# Patient Record
Sex: MALE | Race: WHITE | NOT HISPANIC OR LATINO | Employment: FULL TIME | URBAN - METROPOLITAN AREA
[De-identification: names, ages, dates, MRNs, and addresses within clinical notes are randomized per-mention and may not be internally consistent; named-entity substitution may affect disease eponyms.]

---

## 2023-02-28 ENCOUNTER — HOSPITAL ENCOUNTER (EMERGENCY)
Facility: HOSPITAL | Age: 58
Discharge: HOME | End: 2023-02-28
Attending: EMERGENCY MEDICINE | Admitting: EMERGENCY MEDICINE
Payer: COMMERCIAL

## 2023-02-28 ENCOUNTER — APPOINTMENT (OUTPATIENT)
Dept: GENERAL RADIOLOGY | Facility: HOSPITAL | Age: 58
End: 2023-02-28
Payer: COMMERCIAL

## 2023-02-28 VITALS
SYSTOLIC BLOOD PRESSURE: 140 MMHG | BODY MASS INDEX: 35.22 KG/M2 | WEIGHT: 289.24 LBS | DIASTOLIC BLOOD PRESSURE: 82 MMHG | HEIGHT: 76 IN | TEMPERATURE: 98 F | OXYGEN SATURATION: 93 % | RESPIRATION RATE: 30 BRPM | HEART RATE: 101 BPM

## 2023-02-28 DIAGNOSIS — S43.402A SPRAIN OF LEFT SHOULDER, UNSPECIFIED SHOULDER SPRAIN TYPE, INITIAL ENCOUNTER: Primary | ICD-10-CM
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
## 2023-02-28 DIAGNOSIS — S50.11XA TRAUMATIC HEMATOMA OF RIGHT FOREARM, INITIAL ENCOUNTER: ICD-10-CM

## 2023-02-28 LAB
ALBUMIN SERPL-MCNC: 3.3 G/DL (ref 3.5–5.2)
ALBUMIN/GLOB SERPL: 1.4 G/DL
ALP SERPL-CCNC: 45 U/L (ref 39–117)
ALT SERPL W P-5'-P-CCNC: 26 U/L (ref 1–41)
ANION GAP SERPL CALCULATED.3IONS-SCNC: 8.8 MMOL/L (ref 5–15)
AST SERPL-CCNC: 21 U/L (ref 1–40)
BASOPHILS # BLD AUTO: 0.11 10*3/MM3 (ref 0–0.2)
BASOPHILS NFR BLD AUTO: 0.9 % (ref 0–1.5)
BILIRUB SERPL-MCNC: 0.3 MG/DL (ref 0–1.2)
BUN SERPL-MCNC: 13 MG/DL (ref 6–20)
BUN/CREAT SERPL: 16.3 (ref 7–25)
CALCIUM SPEC-SCNC: 6.9 MG/DL (ref 8.6–10.5)
CHLORIDE SERPL-SCNC: 109 MMOL/L (ref 98–107)
CO2 SERPL-SCNC: 20.2 MMOL/L (ref 22–29)
CREAT SERPL-MCNC: 0.8 MG/DL (ref 0.76–1.27)
DEPRECATED RDW RBC AUTO: 37.6 FL (ref 37–54)
EGFRCR SERPLBLD CKD-EPI 2021: 103.2 ML/MIN/1.73
EOSINOPHIL # BLD AUTO: 0.19 10*3/MM3 (ref 0–0.4)
EOSINOPHIL NFR BLD AUTO: 1.5 % (ref 0.3–6.2)
ERYTHROCYTE [DISTWIDTH] IN BLOOD BY AUTOMATED COUNT: 12.3 % (ref 12.3–15.4)
GLOBULIN UR ELPH-MCNC: 2.4 GM/DL
GLUCOSE SERPL-MCNC: 160 MG/DL (ref 65–99)
HCT VFR BLD AUTO: 45 % (ref 37.5–51)
HGB BLD-MCNC: 15.7 G/DL (ref 13–17.7)
HOLD SPECIMEN: NORMAL
HOLD SPECIMEN: NORMAL
IMM GRANULOCYTES # BLD AUTO: 0.12 10*3/MM3 (ref 0–0.05)
IMM GRANULOCYTES NFR BLD AUTO: 1 % (ref 0–0.5)
LYMPHOCYTES # BLD AUTO: 2.23 10*3/MM3 (ref 0.7–3.1)
LYMPHOCYTES NFR BLD AUTO: 18 % (ref 19.6–45.3)
MCH RBC QN AUTO: 29.5 PG (ref 26.6–33)
MCHC RBC AUTO-ENTMCNC: 34.9 G/DL (ref 31.5–35.7)
MCV RBC AUTO: 84.4 FL (ref 79–97)
MONOCYTES # BLD AUTO: 0.74 10*3/MM3 (ref 0.1–0.9)
MONOCYTES NFR BLD AUTO: 6 % (ref 5–12)
NEUTROPHILS NFR BLD AUTO: 72.6 % (ref 42.7–76)
NEUTROPHILS NFR BLD AUTO: 9 10*3/MM3 (ref 1.7–7)
NRBC BLD AUTO-RTO: 0 /100 WBC (ref 0–0.2)
PLATELET # BLD AUTO: 353 10*3/MM3 (ref 140–450)
PMV BLD AUTO: 10.3 FL (ref 6–12)
POTASSIUM SERPL-SCNC: 3.4 MMOL/L (ref 3.5–5.2)
PROT SERPL-MCNC: 5.7 G/DL (ref 6–8.5)
RBC # BLD AUTO: 5.33 10*6/MM3 (ref 4.14–5.8)
SODIUM SERPL-SCNC: 138 MMOL/L (ref 136–145)
WBC NRBC COR # BLD: 12.39 10*3/MM3 (ref 3.4–10.8)
WHOLE BLOOD HOLD COAG: NORMAL
WHOLE BLOOD HOLD SPECIMEN: NORMAL

## 2023-02-28 PROCEDURE — 25010000002 HYDROMORPHONE 1 MG/ML SOLUTION: Performed by: EMERGENCY MEDICINE

## 2023-02-28 PROCEDURE — 96375 TX/PRO/DX INJ NEW DRUG ADDON: CPT

## 2023-02-28 PROCEDURE — 85025 COMPLETE CBC W/AUTO DIFF WBC: CPT | Performed by: EMERGENCY MEDICINE

## 2023-02-28 PROCEDURE — 25010000002 KETOROLAC TROMETHAMINE PER 15 MG: Performed by: EMERGENCY MEDICINE

## 2023-02-28 PROCEDURE — 73030 X-RAY EXAM OF SHOULDER: CPT

## 2023-02-28 PROCEDURE — 80053 COMPREHEN METABOLIC PANEL: CPT | Performed by: EMERGENCY MEDICINE

## 2023-02-28 PROCEDURE — 99284 EMERGENCY DEPT VISIT MOD MDM: CPT

## 2023-02-28 PROCEDURE — 25010000002 ONDANSETRON PER 1 MG: Performed by: EMERGENCY MEDICINE

## 2023-02-28 PROCEDURE — 36415 COLL VENOUS BLD VENIPUNCTURE: CPT

## 2023-02-28 PROCEDURE — 96374 THER/PROPH/DIAG INJ IV PUSH: CPT

## 2023-02-28 RX ORDER — IBUPROFEN 800 MG/1
800 TABLET ORAL EVERY 8 HOURS PRN
Qty: 20 TABLET | Refills: 0 | Status: SHIPPED | OUTPATIENT
Start: 2023-02-28

## 2023-02-28 RX ORDER — ONDANSETRON 2 MG/ML
4 INJECTION INTRAMUSCULAR; INTRAVENOUS ONCE
Status: COMPLETED | OUTPATIENT
Start: 2023-02-28 | End: 2023-02-28

## 2023-02-28 RX ORDER — KETOROLAC TROMETHAMINE 30 MG/ML
30 INJECTION, SOLUTION INTRAMUSCULAR; INTRAVENOUS ONCE
Status: COMPLETED | OUTPATIENT
Start: 2023-02-28 | End: 2023-02-28

## 2023-02-28 RX ORDER — HYDROCODONE BITARTRATE AND ACETAMINOPHEN 7.5; 325 MG/1; MG/1
1 TABLET ORAL EVERY 6 HOURS PRN
Qty: 12 TABLET | Refills: 0 | Status: SHIPPED | OUTPATIENT
Start: 2023-02-28

## 2023-02-28 RX ORDER — GINSENG 100 MG
1 CAPSULE ORAL EVERY 8 HOURS SCHEDULED
Status: DISCONTINUED | OUTPATIENT
Start: 2023-02-28 | End: 2023-02-28 | Stop reason: HOSPADM

## 2023-02-28 RX ADMIN — BACITRACIN 0.9 G: 500 OINTMENT TOPICAL at 13:30

## 2023-02-28 RX ADMIN — KETOROLAC TROMETHAMINE 30 MG: 30 INJECTION, SOLUTION INTRAMUSCULAR; INTRAVENOUS at 10:23

## 2023-02-28 RX ADMIN — HYDROMORPHONE HYDROCHLORIDE 1 MG: 1 INJECTION, SOLUTION INTRAMUSCULAR; INTRAVENOUS; SUBCUTANEOUS at 10:24

## 2023-02-28 RX ADMIN — ONDANSETRON 4 MG: 2 INJECTION INTRAMUSCULAR; INTRAVENOUS at 10:24

## 2023-03-16 ENCOUNTER — OFFICE VISIT (OUTPATIENT)
Dept: ORTHOPEDIC SURGERY | Facility: CLINIC | Age: 58
End: 2023-03-16
Payer: COMMERCIAL

## 2023-03-16 VITALS — OXYGEN SATURATION: 96 % | WEIGHT: 290.2 LBS | HEIGHT: 77 IN | HEART RATE: 117 BPM | BODY MASS INDEX: 34.26 KG/M2

## 2023-03-16 DIAGNOSIS — S49.92XA INJURY OF LEFT SHOULDER, INITIAL ENCOUNTER: Primary | ICD-10-CM

## 2023-03-16 PROCEDURE — 99203 OFFICE O/P NEW LOW 30 MIN: CPT | Performed by: ORTHOPAEDIC SURGERY

## 2023-03-16 NOTE — PROGRESS NOTES
"Chief Complaint  Initial Evaluation of the Left Shoulder     Subjective      Salvador Briones presents to [facility] for evaluation of the left shoulder. The patient reports he hit [place] fence [place] a [place] on 2/28/23 injuring his shoulder. He reports pain with ROM of the shoulder. He has elevation of the clavicle. He has no other complaints.     No Known Allergies     Social History     Socioeconomic History   • Marital status: Single   Tobacco Use   • Smoking status: Never   • Smokeless tobacco: Never        Review of Systems     Objective   Vital Signs:   Pulse 117   Ht 194.3 cm (76.5\")   Wt 132 kg (290 lb 3.2 oz)   SpO2 96%   BMI 34.86 kg/m²       Physical Exam  Constitutional:       Appearance: Normal appearance. The patient is well-developed and normal weight.   HENT:      Head: Normocephalic.      Right Ear: Hearing and external ear normal.      Left Ear: Hearing and external ear normal.      Nose: Nose normal.   Eyes:      Conjunctiva/sclera: Conjunctivae normal.   Cardiovascular:      Rate and Rhythm: Normal rate.   Pulmonary:      Effort: Pulmonary effort is normal.      Breath sounds: No wheezing or rales.   Abdominal:      Palpations: Abdomen is soft.      Tenderness: There is no abdominal tenderness.   Musculoskeletal:      Cervical back: Normal range of motion.   Skin:     Findings: No rash.   Neurological:      Mental Status: The patient is alert and oriented to person, place, and time.   Psychiatric:         Mood and Affect: Mood and affect normal.         Judgment: Judgment normal.       Ortho Exam      Left shoulder- ecchymosis to anterior chest and anterior lateral shoulder. Elevation and displacement of clavicle suggesting acromioclavicular joint separation. Forward elevation 90. Abduction 90. External Rotation 40. Internal rotation 30. 4/5 supraspinatus strength 4+ infraspinatus 5/5 subscapularis. Internal rotation to L-5.     Procedures      Imaging Results " (Most Recent)     None           Result Review :       XR Shoulder 2+ View Left    Result Date: 2/28/2023  Narrative: PROCEDURE: XR SHOULDER 2+ VW LEFT  COMPARISON: None  INDICATIONS: LEFT SHOULDER PAIN AFTER 4 WELCH ACCIDENT  FINDINGS:  There is mild acromioclavicular and glenohumeral osteophyte formation.  There is no acute fracture or dislocation.  No erosions.  There is maintenance of the acromiohumeral distance.      Impression:  Degenerative changes without displaced fracture.      MARIFER MUHAMMAD MD       Electronically Signed and Approved By: MARIFER MUHAMMAD MD on 2/28/2023 at 11:04                      Assessment and Plan     Diagnoses and all orders for this visit:    1. Injury of left shoulder, initial encounter (Primary)        Discussed the treatment plan with the patient.  I reviewed the previous x-rays with the patient. Plan for MRI of the left shoulder to evaluate for a rotator cuff tear and acromioclavicular joint separation. The patient expressed understanding and wished to proceed. Work note given today. Sling given today.     Call or return if worsening symptoms.    Follow Up     MRI results      Patient was given instructions and counseling regarding his condition or for health maintenance advice. Please see specific information pulled into the AVS if appropriate.     Scribed for Brenden Chow MD by Leonila Rubi.  03/16/23   10:55 EDT    I have personally performed the services described in this document as scribed by the above individual and it is both accurate and complete. Brenden Chow MD 03/18/23

## 2023-03-17 ENCOUNTER — HOSPITAL ENCOUNTER (OUTPATIENT)
Dept: MRI IMAGING | Facility: HOSPITAL | Age: 58
Discharge: HOME | End: 2023-03-17
Admitting: ORTHOPAEDIC SURGERY
Payer: COMMERCIAL

## 2023-03-17 PROCEDURE — 73221 MRI JOINT UPR EXTREM W/O DYE: CPT

## 2023-03-21 ENCOUNTER — OFFICE VISIT (OUTPATIENT)
Dept: ORTHOPEDIC SURGERY | Facility: CLINIC | Age: 58
End: 2023-03-21
Payer: COMMERCIAL

## 2023-03-21 VITALS — HEART RATE: 80 BPM | WEIGHT: 290 LBS | BODY MASS INDEX: 34.24 KG/M2 | HEIGHT: 77 IN | OXYGEN SATURATION: 98 %

## 2023-03-21 DIAGNOSIS — S42.152A CLOSED FRACTURE OF GLENOID CAVITY AND NECK OF LEFT SCAPULA, INITIAL ENCOUNTER: ICD-10-CM

## 2023-03-21 DIAGNOSIS — M24.012 LOOSE BODY IN SHOULDER JOINT, LEFT: ICD-10-CM

## 2023-03-21 DIAGNOSIS — M75.102 TEAR OF LEFT ROTATOR CUFF, UNSPECIFIED TEAR EXTENT, UNSPECIFIED WHETHER TRAUMATIC: ICD-10-CM

## 2023-03-21 DIAGNOSIS — S43.102A SEPARATION OF LEFT ACROMIOCLAVICULAR JOINT, INITIAL ENCOUNTER: Primary | ICD-10-CM

## 2023-03-21 DIAGNOSIS — S42.142A CLOSED FRACTURE OF GLENOID CAVITY AND NECK OF LEFT SCAPULA, INITIAL ENCOUNTER: ICD-10-CM

## 2023-03-21 DIAGNOSIS — M75.82 ROTATOR CUFF TENDONITIS, LEFT: ICD-10-CM

## 2023-03-21 PROCEDURE — 99213 OFFICE O/P EST LOW 20 MIN: CPT | Performed by: ORTHOPAEDIC SURGERY

## 2023-03-21 NOTE — PROGRESS NOTES
"Chief Complaint  Follow-up of the Left Shoulder     Subjective      Salvador Briones presents to [facility] for follow up evaluation of the left shoulder. The patient recently had an MRI and is here today for those results. To review, The patient reports he hit a fence on a [place] on 2/28/23 injuring his shoulder. He reports pain with ROM of the shoulder. He has elevation of the clavicle. He has no other complaints. He is wearing a sling.     No Known Allergies     Social History     Socioeconomic History   • Marital status: Single   Tobacco Use   • Smoking status: Never   • Smokeless tobacco: Never        Review of Systems     Objective   Vital Signs:   Pulse 80   Ht 194.3 cm (76.5\")   Wt 132 kg (290 lb)   SpO2 98%   BMI 34.84 kg/m²       Physical Exam  Constitutional:       Appearance: Normal appearance. The patient is well-developed and normal weight.   HENT:      Head: Normocephalic.      Right Ear: Hearing and external ear normal.      Left Ear: Hearing and external ear normal.      Nose: Nose normal.   Eyes:      Conjunctiva/sclera: Conjunctivae normal.   Cardiovascular:      Rate and Rhythm: Normal rate.   Pulmonary:      Effort: Pulmonary effort is normal.      Breath sounds: No wheezing or rales.   Abdominal:      Palpations: Abdomen is soft.      Tenderness: There is no abdominal tenderness.   Musculoskeletal:      Cervical back: Normal range of motion.   Skin:     Findings: No rash.   Neurological:      Mental Status: The patient is alert and oriented to person, place, and time.   Psychiatric:         Mood and Affect: Mood and affect normal.         Judgment: Judgment normal.       Ortho Exam      Left shoulder- ecchymosis to anterior chest and anterior lateral shoulder. Elevation and displacement of clavicle suggesting acromioclavicular joint separation. Forward elevation 90. Abduction 90. External Rotation 40. Internal rotation 30. 4/5 supraspinatus strength 4+ " infraspinatus 5/5 subscapularis. Internal rotation to L-5.     Procedures      Imaging Results (Most Recent)     None           Result Review :       XR Shoulder 2+ View Left    Result Date: 2/28/2023  Narrative: PROCEDURE: XR SHOULDER 2+ VW LEFT  COMPARISON: None  INDICATIONS: LEFT SHOULDER PAIN AFTER 4 WELCH ACCIDENT  FINDINGS:  There is mild acromioclavicular and glenohumeral osteophyte formation.  There is no acute fracture or dislocation.  No erosions.  There is maintenance of the acromiohumeral distance.      Impression:  Degenerative changes without displaced fracture.      MARIFER MUHAMMAD MD       Electronically Signed and Approved By: MARIFER MUHAMMAD MD on 2/28/2023 at 11:04             MRI Shoulder Left Without Contrast    Result Date: 3/17/2023  Narrative: PROCEDURE: MRI SHOULDER LEFT WO CONTRAST  COMPARISON: [facility], CR, XR SHOULDER 2+ VW LEFT, 2/28/2023, 10:16.  INDICATIONS: Left shoulder injury 2-28-23; left shoulder pain, with limited range of motion.      TECHNIQUE: A variety of imaging planes and parameters were utilized for visualization of suspected pathology.  Images were performed without contrast.   FINDINGS:  Examination is limited by patient motion artifact.  Axial proton density images were not obtained.   The humeral head is subluxed superiorly 1.2 cm.  The clavicle is displaced superiorly 0.9 cm in relation to the acromion.  The coracoclavicular ligament is torn.  The coracoacromial ligament appears partially torn.  Moderate soft tissue edema/hemorrhage is noted around the AC joint.  An AC joint effusion is noted.  Mild acromioclavicular osteoarthritic changes are present.  Along the superior aspect of the acromioclavicular joint is a 1.4 cm x 0.4 cm os ossific density suspected to represent a fracture fragment.  A distinct donor site is not identified.  T2 high signal consistent with edema is noted throughout the glenoid.  There is an intra-articular fracture extending  from the superior glenoid to the inferior glenoid alignment is near anatomic.  There is edema noted in the posterosuperior aspect of the humeral head with associated 1.5 cm wide cortical irregularity consistent with an impaction fracture.  There is up to 0.2 cm depression of the fracture.  There is a small intrasubstance tear of the distal supraspinatus tendon anterior fibers at the footprint.  Severe supraspinatus tendinopathy is noted.  There is partial thickness articular surface tearing of the distal tendon more posteriorly which extends up to approximately 75% of tendon thickness.  The rotator cuff otherwise appears unremarkable.  No muscle body atrophy is seen.  The biceps long head tendon and its attachment to the superior labrum are intact.  No overt labral tear is identified.  However, axial proton density weighted images were not performed which limits evaluation.  In the axillary pouch is a 2.2 cm by 0.6 cm loose body suspected to represent a displaced chondral fragment from the inferior glenoid.  A small glenohumeral joint effusion is noted.        Impression:   1. AC joint separation, as above 2. 1.4 cm x 0.4 cm fracture fragment along the superior aspect of the AC joint, the donor site of which is not distinctly demonstrated. 3. Intra-articular fracture of the glenoid in near anatomic alignment 4. Small impaction fracture along the posterosuperior aspect of the humeral head, as above 5. 2.2 cm x 0.6 cm loose body in the axillary pouch favored to represent a chondral fragment off the inferior glenoid. 6. Severe supraspinatus tendon.  Partial thickness articular surface tearing of the supraspinatus tendon extending up to 75% of tendon thickness. 7. Limited examination.  No overt labral tear.      Oral Stephens M.D.       Electronically Signed and Approved By: Oral Stephens M.D. on 3/17/2023 at 15:16                      Assessment and Plan     Diagnoses and all orders for this visit:    1. Separation of  left acromioclavicular joint, initial encounter (Primary)    2. Closed fracture of glenoid cavity and neck of left scapula, initial encounter    3. Tear of left rotator cuff, unspecified tear extent, unspecified whether traumatic    4. Loose body in shoulder joint, left    5. Rotator cuff tendonitis, left        Discussed the treatment options with the patient, operative vs non-operative. Discussed the risks and benefits of a Left shoulder acromioclavicular joint repair, rotator cuff repair, SAD vs conservative treatment with physical therapy, home exercises and using the sling. The patient expressed understanding and wished to proceed with conservative treatment. Work note given today. Order for PT given today. Plan to continue sling and remove to work on exercises.     Call or return if worsening symptoms.    Follow Up     4 weeks      Patient was given instructions and counseling regarding his condition or for health maintenance advice. Please see specific information pulled into the AVS if appropriate.     Scribed for Brenden Chow MD by Leonila Rubi.  03/21/23   10:54 EDT    I have personally performed the services described in this document as scribed by the above individual and it is both accurate and complete. Brenden Chow MD 03/21/23

## 2023-04-21 ENCOUNTER — OFFICE VISIT (OUTPATIENT)
Dept: ORTHOPEDIC SURGERY | Facility: CLINIC | Age: 58
End: 2023-04-21
Payer: COMMERCIAL

## 2023-04-21 VITALS — BODY MASS INDEX: 35.02 KG/M2 | WEIGHT: 296.6 LBS | HEART RATE: 106 BPM | OXYGEN SATURATION: 96 % | HEIGHT: 77 IN

## 2023-04-21 DIAGNOSIS — M75.102 TEAR OF LEFT ROTATOR CUFF, UNSPECIFIED TEAR EXTENT, UNSPECIFIED WHETHER TRAUMATIC: ICD-10-CM

## 2023-04-21 DIAGNOSIS — M75.82 ROTATOR CUFF TENDONITIS, LEFT: ICD-10-CM

## 2023-04-21 DIAGNOSIS — S42.152A CLOSED FRACTURE OF GLENOID CAVITY AND NECK OF LEFT SCAPULA, INITIAL ENCOUNTER: ICD-10-CM

## 2023-04-21 DIAGNOSIS — M24.012 LOOSE BODY IN SHOULDER JOINT, LEFT: ICD-10-CM

## 2023-04-21 DIAGNOSIS — S43.102A SEPARATION OF LEFT ACROMIOCLAVICULAR JOINT, INITIAL ENCOUNTER: Primary | ICD-10-CM

## 2023-04-21 DIAGNOSIS — S42.142A CLOSED FRACTURE OF GLENOID CAVITY AND NECK OF LEFT SCAPULA, INITIAL ENCOUNTER: ICD-10-CM

## 2023-04-21 NOTE — PROGRESS NOTES
"Chief Complaint  Follow-up of the Left Shoulder (L AC SEPERATION 1 MO F/U)     Subjective      Salvador Briones presents to [facility] for follow up evaluation of the left shoulder. The patient has been treating his left shoulder acromioclavicular joint separation, glenoid fracture, rotator cuff tear, tendonitis, and loose body conservatively. To review, The patient reports he hit a fence on a [place] on 2[place]/23 injuring his shoulder. He reports pain with ROM of the shoulder. He has elevation of the clavicle. He has been attending physical therapy. He has weaned out of sling. He reports his pain is improving.     No Known Allergies     Social History     Socioeconomic History   • Marital status: Single   Tobacco Use   • Smoking status: Never   • Smokeless tobacco: Never   Vaping Use   • Vaping Use: Never used        Review of Systems     Objective   Vital Signs:   Pulse 106   Ht 194.3 cm (76.5\")   Wt 135 kg (296 lb 9.6 oz)   SpO2 96%   BMI 35.63 kg/m²       Physical Exam  Constitutional:       Appearance: Normal appearance. The patient is well-developed and normal weight.   HENT:      Head: Normocephalic.      Right Ear: Hearing and external ear normal.      Left Ear: Hearing and external ear normal.      Nose: Nose normal.   Eyes:      Conjunctiva/sclera: Conjunctivae normal.   Cardiovascular:      Rate and Rhythm: Normal rate.   Pulmonary:      Effort: Pulmonary effort is normal.      Breath sounds: No wheezing or rales.   Abdominal:      Palpations: Abdomen is soft.      Tenderness: There is no abdominal tenderness.   Musculoskeletal:      Cervical back: Normal range of motion.   Skin:     Findings: No rash.   Neurological:      Mental Status: The patient is alert and oriented to person, place, and time.   Psychiatric:         Mood and Affect: Mood and affect normal.         Judgment: Judgment normal.       Ortho Exam    Left shoulder- Elevation and displacement of clavicle " suggesting acromioclavicular joint separation. Forward elevation 150. Abduction 120. External Rotation 45. Internal rotation 40. 4+/5 supraspinatus strength 5/5 infraspinatus 5/5 subscapularis. Internal rotation to L-5.     Procedures      Imaging Results (Most Recent)     None           Result Review :       No results found.           Assessment and Plan     Diagnoses and all orders for this visit:    1. Separation of left acromioclavicular joint, initial encounter (Primary)    2. Closed fracture of glenoid cavity and neck of left scapula, initial encounter    3. Tear of left rotator cuff, unspecified tear extent, unspecified whether traumatic    4. Loose body in shoulder joint, left    5. Rotator cuff tendonitis, left        Discussed the treatment plan with the patient.  Plan to continue conservative treatment. Plan to continue physical therapy. Work note given today.     Call or return if worsening symptoms.    Follow Up     6 weeks, no x-rays needed      Patient was given instructions and counseling regarding his condition or for health maintenance advice. Please see specific information pulled into the AVS if appropriate.     Scribed for Brenden Chow MD by Leonila Rubi.  04/21/23   10:20 EDT    I have personally performed the services described in this document as scribed by the above individual and it is both accurate and complete. Brenden Chow MD 04/23/23

## 2023-06-02 PROBLEM — S49.92XA INJURY OF LEFT SHOULDER: Status: ACTIVE | Noted: 2023-06-02

## 2024-04-11 ENCOUNTER — TRANSCRIBE ORDERS (OUTPATIENT)
Dept: ADMINISTRATIVE | Facility: HOSPITAL | Age: 59
End: 2024-04-11
Payer: COMMERCIAL

## 2024-04-11 DIAGNOSIS — R01.1 MURMUR: Primary | ICD-10-CM
